# Patient Record
Sex: FEMALE | Race: BLACK OR AFRICAN AMERICAN | Employment: UNEMPLOYED | URBAN - METROPOLITAN AREA
[De-identification: names, ages, dates, MRNs, and addresses within clinical notes are randomized per-mention and may not be internally consistent; named-entity substitution may affect disease eponyms.]

---

## 2019-10-30 ENCOUNTER — HOSPITAL ENCOUNTER (EMERGENCY)
Age: 64
Discharge: HOME | End: 2019-10-31
Attending: EMERGENCY MEDICINE
Payer: MEDICARE

## 2019-10-30 ENCOUNTER — APPOINTMENT (OUTPATIENT)
Dept: GENERAL RADIOLOGY | Age: 64
End: 2019-10-30
Attending: NURSE PRACTITIONER
Payer: MEDICARE

## 2019-10-30 VITALS
DIASTOLIC BLOOD PRESSURE: 85 MMHG | OXYGEN SATURATION: 97 % | RESPIRATION RATE: 18 BRPM | SYSTOLIC BLOOD PRESSURE: 125 MMHG | TEMPERATURE: 98.8 F | HEART RATE: 89 BPM

## 2019-10-30 DIAGNOSIS — M79.18 MUSCULOSKELETAL PAIN: ICD-10-CM
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
## 2019-10-30 PROCEDURE — 72100 X-RAY EXAM L-S SPINE 2/3 VWS: CPT

## 2019-10-30 PROCEDURE — 73501 X-RAY EXAM HIP UNI 1 VIEW: CPT

## 2019-10-30 PROCEDURE — 99281 EMR DPT VST MAYX REQ PHY/QHP: CPT

## 2019-10-31 RX ORDER — METHOCARBAMOL 500 MG/1
500 TABLET, FILM COATED ORAL 4 TIMES DAILY
Qty: 20 TAB | Refills: 0 | Status: SHIPPED | OUTPATIENT
Start: 2019-10-31

## 2019-10-31 RX ORDER — DEXTROMETHORPHAN HYDROBROMIDE, GUAIFENESIN 5; 100 MG/5ML; MG/5ML
650 LIQUID ORAL EVERY 8 HOURS
Qty: 20 TAB | Refills: 0 | Status: SHIPPED | OUTPATIENT
Start: 2019-10-31

## 2019-10-31 NOTE — DISCHARGE INSTRUCTIONS
Patient Education        Preventing Falls: Care Instructions  Your Care Instructions    Getting around your home safely can be a challenge if you have injuries or health problems that make it easy for you to fall. Loose rugs and furniture in walkways are among the dangers for many older people who have problems walking or who have poor eyesight. People who have conditions such as arthritis, osteoporosis, or dementia also have to be careful not to fall. You can make your home safer with a few simple measures. Follow-up care is a key part of your treatment and safety. Be sure to make and go to all appointments, and call your doctor if you are having problems. It's also a good idea to know your test results and keep a list of the medicines you take. How can you care for yourself at home? Taking care of yourself  · You may get dizzy if you do not drink enough water. To prevent dehydration, drink plenty of fluids, enough so that your urine is light yellow or clear like water. Choose water and other caffeine-free clear liquids. If you have kidney, heart, or liver disease and have to limit fluids, talk with your doctor before you increase the amount of fluids you drink. · Exercise regularly to improve your strength, muscle tone, and balance. Walk if you can. Swimming may be a good choice if you cannot walk easily. · Have your vision and hearing checked each year or any time you notice a change. If you have trouble seeing and hearing, you might not be able to avoid objects and could lose your balance. · Know the side effects of the medicines you take. Ask your doctor or pharmacist whether the medicines you take can affect your balance. Sleeping pills or sedatives can affect your balance. · Limit the amount of alcohol you drink. Alcohol can impair your balance and other senses. · Ask your doctor whether calluses or corns on your feet need to be removed.  If you wear loose-fitting shoes because of calluses or corns, you can lose your balance and fall. · Talk to your doctor if you have numbness in your feet. Preventing falls at home  · Remove raised doorway thresholds, throw rugs, and clutter. Repair loose carpet or raised areas in the floor. · Move furniture and electrical cords to keep them out of walking paths. · Use nonskid floor wax, and wipe up spills right away, especially on ceramic tile floors. · If you use a walker or cane, put rubber tips on it. If you use crutches, clean the bottoms of them regularly with an abrasive pad, such as steel wool. · Keep your house well lit, especially Corita Bal, and outside walkways. Use night-lights in areas such as hallways and bathrooms. Add extra light switches or use remote switches (such as switches that go on or off when you clap your hands) to make it easier to turn lights on if you have to get up during the night. · Install sturdy handrails on stairways. · Move items in your cabinets so that the things you use a lot are on the lower shelves (about waist level). · Keep a cordless phone and a flashlight with new batteries by your bed. If possible, put a phone in each of the main rooms of your house, or carry a cell phone in case you fall and cannot reach a phone. Or, you can wear a device around your neck or wrist. You push a button that sends a signal for help. · Wear low-heeled shoes that fit well and give your feet good support. Use footwear with nonskid soles. Check the heels and soles of your shoes for wear. Repair or replace worn heels or soles. · Do not wear socks without shoes on wood floors. · Walk on the grass when the sidewalks are slippery. If you live in an area that gets snow and ice in the winter, sprinkle salt on slippery steps and sidewalks. Preventing falls in the bath  · Install grab bars and nonskid mats inside and outside your shower or tub and near the toilet and sinks. · Use shower chairs and bath benches.   · Use a hand-held shower head that will allow you to sit while showering. · Get into a tub or shower by putting the weaker leg in first. Get out of a tub or shower with your strong side first.  · Repair loose toilet seats and consider installing a raised toilet seat to make getting on and off the toilet easier. · Keep your bathroom door unlocked while you are in the shower. Where can you learn more? Go to http://bambi-trish.info/. Enter [phone] in the search box to learn more about \"Preventing Falls: Care Instructions. \"  Current as of: March 16, 2018  Content Version: 11.8  © 7242-2593 [employer]wise, [employer]. Care instructions adapted under license by [employer] (which disclaims liability or warranty for this information). If you have questions about a medical condition or this instruction, always ask your healthcare professional. [address] any warranty or liability for your use of this information.

## 2019-10-31 NOTE — ED TRIAGE NOTES
Patient A/O x 4, complaining of lower back pain, left sided hip pain x yesterday. Patient states she missed the chair and fell on floor.

## 2019-10-31 NOTE — ED PROVIDER NOTES
DR. [facility]  Emergency Department Treatment Report        10:43 PM Tiffany Mares is a 66 y.o. female with a history of a fall yesterday when she was trying to sit on a chair and it fell out from underneath her. She landed on her bottom who presents to ED with low back pain and left hip pain. Patient is ambulatory states she fell yesterday and decided to come in tonight and get checked out. No numbness or tingling is reported no saddle anesthesia no urinary incontinence or retention. No other complaints, associated symptoms or modifying factors at this time. PCP: None      The history is provided by the patient. No  was used. Fall   The accident occurred 12 to 24 hours ago. The fall occurred while standing. She fell from a height of ground level. She landed on hard floor. The point of impact was the left hip. The pain is present in the left hip. The pain is mild. She was ambulatory at the scene. There was no entrapment after the fall. There was no drug use involved in the accident. There was no alcohol use involved in the accident. Pertinent negatives include no fever, no abdominal pain, no nausea, no vomiting, no hematuria, no extremity weakness, no hearing loss and no loss of consciousness. The symptoms are aggravated by activity. She has tried nothing for the symptoms.         Past Medical History:   Diagnosis Date    Atrial fibrillation ([address]     Atrial fibrillation (HCC)     Migraine headache     Other and unspecified hyperlipidemia     Paroxysmal supraventricular tachycardia (HCC)     Poisoning by cardiac rhythm regulators(972.0)     Shortness of breath     Undiagnosed cardiac murmurs     Unspecified combined systolic and diastolic heart failure (HCC)        Past Surgical History:   Procedure Laterality Date    HX HERNIA REPAIR      HX HYSTERECTOMY           Family History:   Problem Relation Age of Onset    Stroke Father        Social History Socioeconomic History    Marital status: SINGLE     Spouse name: Not on file    Number of children: Not on file    Years of education: Not on file    Highest education level: Not on file   Occupational History    Not on file   Social Needs    Financial resource strain: Not on file    Food insecurity:     Worry: Not on file     Inability: Not on file    Transportation needs:     Medical: Not on file     Non-medical: Not on file   Tobacco Use    Smoking status: Current Every Day Smoker   Substance and Sexual Activity    Alcohol use: No    Drug use: No    Sexual activity: Not on file   Lifestyle    Physical activity:     Days per week: Not on file     Minutes per session: Not on file    Stress: Not on file   Relationships    Social connections:     Talks on phone: Not on file     Gets together: Not on file     Attends Hindu service: Not on file     Active member of club or organization: Not on file     Attends meetings of clubs or organizations: Not on file     Relationship status: Not on file    Intimate partner violence:     Fear of current or ex partner: Not on file     Emotionally abused: Not on file     Physically abused: Not on file     Forced sexual activity: Not on file   Other Topics Concern    Not on file   Social History Narrative    Not on file         ALLERGIES: Latex; Motrin [ibuprofen]; Shellfish derived; and Sulfa (sulfonamide antibiotics)    Review of Systems   Constitutional: Negative for fever. Respiratory: Negative for chest tightness. Cardiovascular: Negative for chest pain. Gastrointestinal: Negative for abdominal pain, nausea and vomiting. Genitourinary: Negative for hematuria. Musculoskeletal: Positive for arthralgias and back pain. Negative for extremity weakness, gait problem, joint swelling, neck pain and neck stiffness. Skin: Negative for color change. Neurological: Negative for dizziness and loss of consciousness.    All other systems reviewed and are negative. Vitals:    10/30/19 2238   BP: 125/85   Pulse: 89   Resp: 18   Temp: 98.8 °F (37.1 °C)   SpO2: 97%            Physical Exam   Constitutional: She is oriented to person, place, and time. She appears well-developed and well-nourished. HENT:   Head: Normocephalic and atraumatic. Eyes: Pupils are equal, round, and reactive to light. Conjunctivae and EOM are normal.   Neck: Normal range of motion. Neck supple. Cardiovascular: Normal rate and regular rhythm. Pulmonary/Chest: Effort normal and breath sounds normal.   Abdominal: Soft. Bowel sounds are normal.   Musculoskeletal: Normal range of motion. She exhibits tenderness. She exhibits no edema or deformity. Back:         Legs:  Neurological: She is alert and oriented to person, place, and time. She has normal strength and normal reflexes. No sensory deficit. She displays a negative Romberg sign. GCS eye subscore is 4. GCS verbal subscore is 5. GCS motor subscore is 6. Skin: Skin is warm and dry. Psychiatric: She has a normal mood and affect. Her behavior is normal. Judgment and thought content normal.   Nursing note and vitals reviewed. MDM  Number of Diagnoses or Management Options  Fall, initial encounter:   Musculoskeletal pain:   Diagnosis management comments: I suspect soft tissue injury after fall I will order hip and low back x-ray suspect I will be able to discharge patient home with Robaxin and Motrin. X-rays are negative for any acute findings I suspect musculoskeletal pain related to the fall. Patient will be treated with Robaxin for outpatient I offered Motrin patient did not want Motrin wanted Tylenol instead I provided a prescription for Tylenol. Patient will follow-up with her primary care and return to the emergency room if worse.        Amount and/or Complexity of Data Reviewed  Tests in the radiology section of CPT®: ordered and reviewed  Review and summarize past medical records: yes  Independent visualization of images, tracings, or specimens: yes    Risk of Complications, Morbidity, and/or Mortality  Presenting problems: low  Diagnostic procedures: low  Management options: low    Patient Progress  Patient progress: stable         Procedures            Vitals:  Patient Vitals for the past 12 hrs:   Temp Pulse Resp BP SpO2   10/30/19 2238 98.8 °F (37.1 °C) 89 18 125/85 97 %           X-Ray, CT or other radiology findings or impressions:  XR HIP LT W OR WO PELV  1 VW    (Results Pending)   XR SPINE LUMB 2 OR 3 V    (Results Pending)           Disposition:    Diagnosis:   1. Fall, initial encounter    2. Musculoskeletal pain        Disposition: to Home      Follow-up Information     Follow up With Specialties Details Why Contact Info    Isis England MD [facility] In 2 days  [address] [facility]  [phone]             Patient's Medications   Start Taking    ACETAMINOPHEN (TYLENOL) 650 MG TBER    Take 1 Tab by mouth every eight (8) hours. METHOCARBAMOL (ROBAXIN) 500 MG TABLET    Take 1 Tab by mouth four (4) times daily. Continue Taking    ASPIRIN (ASPIRIN) 325 MG TABLET    Take 325 mg by mouth daily. CETIRIZINE (ZYRTEC) 10 MG TABLET    Take  by mouth. As directed. FAMOTIDINE (PEPCID) 20 MG TABLET    Take 20 mg by mouth daily. GUAIFENESIN SR (MUCINEX) 600 MG SR TABLET    Take 600 mg by mouth. As directed. HYDROCODONE-ACETAMINOPHEN 5-500 MG CAP    Take  by mouth. MEDROXYPROGESTERONE (PROVERA) 2.5 MG TABLET    Take  by mouth daily. METHOCARBAMOL (ROBAXIN) 750 MG TABLET    Take  by mouth four (4) times daily. MOMETASONE (NASONEX) 50 MCG/ACTUATION NASAL SPRAY    2 Sprays. As directed. MULTIVITS-MIN/FA/LYCOPENE/LUT (CENTRAL-WHITNEY SENIOR PO)    Take  by mouth. NAPROXEN SODIUM (ALEVE) 220 MG TABLET    Take 220 mg by mouth as needed. PROPAFENONE (RYTHMOL) 225 MG TABLET    Take 225 mg by mouth two (2) times a day.     TETRAHYDROZOLINE (VISINE) 0.05 % OPHTHALMIC SOLUTION    Administer 1 Drop to both eyes daily as needed. These Medications have changed    No medications on file   Stop Taking    No medications on file       Return to the ER if you are unable to obtain referral as directed. Douglas Porter's  results have been reviewed with her. She has been counseled regarding her diagnosis, treatment, and plan. She verbally conveys understanding and agreement of the signs, symptoms, diagnosis, treatment and prognosis and additionally agrees to follow up as discussed. She also agrees with the care-plan and conveys that all of her questions have been answered. I have also provided discharge instructions for her that include: educational information regarding their diagnosis and treatment, and list of reasons why they would want to return to the ED prior to their follow-up appointment, should her condition change. Mona Magaña ENP-C,FNP-C      Dragon voice recognition was used to generate this report, which may have resulted in some phonetic based errors in grammar and contents.  Even though attempts were made to correct all the mistakes, some may have been missed, and remained in the body of the document

## 2022-02-11 ENCOUNTER — TRANSCRIBE ORDER (OUTPATIENT)
Dept: SCHEDULING | Age: 67
End: 2022-02-11

## 2022-02-11 DIAGNOSIS — M75.100 ROTATOR CUFF SYNDROME: Primary | ICD-10-CM

## 2022-02-22 ENCOUNTER — HOSPITAL ENCOUNTER (OUTPATIENT)
Dept: MRI IMAGING | Age: 67
Discharge: HOME | End: 2022-02-22
Attending: ORTHOPAEDIC SURGERY
Payer: MEDICARE

## 2022-02-22 DIAGNOSIS — M75.100 ROTATOR CUFF SYNDROME: ICD-10-CM

## 2022-02-22 PROCEDURE — 73221 MRI JOINT UPR EXTREM W/O DYE: CPT

## 2023-10-18 NOTE — PERIOP NOTE
Pt was diagnosed with Bronchitis on 10-17-23. Symptoms include cough and congestion. No fever reported. Per Dr. Joss Del Angel, pt needs to postpone upcoming surgery by 4 weeks from diagnosis. Request faxed to Dr. Maria Eugenia Berg office. [facility] malena Rivera.

## 2023-10-23 PROBLEM — M65.312 TRIGGER THUMB OF LEFT HAND: Status: ACTIVE | Noted: 2023-10-23

## 2023-10-23 PROBLEM — M65.332 TRIGGER FINGER, LEFT MIDDLE FINGER: Status: ACTIVE | Noted: 2023-10-23

## 2023-10-23 NOTE — H&P
Patient Name:   Tri Calderon     YOB: 1955    Chief Complaint:  Trigger finger followup. History of Chief Complaint:  Ana Jaramillo presents today having continued triggering of the left hand thumb and 3rd digit. She has previously gone through injections. She has discussed surgery. At this point, she would require a trigger thumb and trigger finger release of the left hand. She is also having continued problems with her back. She has degenerative disc disease and hip pathology that are nontraumatic degenerative issues that may require surgery over time. We discussed physical therapy for that. Past Medical/Surgical History:    Disease/Disorder Date Side Surgery Date Side Comment   Acid reflux         AF - Atrial fibrillation         Arthritis         Depression         Headache         Heart attack 2015        Hiatal hernia         Hypercholesterolemia         Hypertension         IBS - Irritable bowel syndrome         Neuropathy         Seasonal allergy      DL 11/14/2022 -allergy shots   Stroke 09/2022        Urinary incontinence            Foot surgery  bilateral       Hysterectomy         Lipoma excision, thigh area 11/2022 left       Rotator cuff repair 08/2022 right       Tonsillectomy        Allergies:    Ingredient Reaction Medication Name Comment   [address]   can take coated aspirin   SULFA (SULFONAMIDE ANTIBIOTICS)      IBUPROFEN      LATEX        Current Medications:    Medication Directions   [place] 0.025 % (0.035 %) eye drops INSTILL 1 DROP INTO BOTH EYES TWICE A DAY AS NEEDED   albuterol sulfate HFA 90 mcg/actuation aerosol inhaler INHALE 2 PUFF USING INHALER EVERY FOUR TO SIX HOURS AS NEEDED FOR COUGH, WHEEZE, SHORTNESS OF BREATH   amlodipine 5 mg tablet TAKE 1 TABLET (5 MG TOTAL) BY MOUTH DAILY.    azelastine 0.05 % eye drops INSTILL 1 DROP INTO BOTH EYES TWICE A DAY AS NEEDED   butalbital-acetaminophen-caffeine 50 mg-325 mg-40 mg tablet

## 2023-10-30 ENCOUNTER — ANESTHESIA EVENT (OUTPATIENT)
Facility: HOSPITAL | Age: 68
End: 2023-10-30
Payer: MEDICARE

## 2023-10-30 ENCOUNTER — HOSPITAL ENCOUNTER (OUTPATIENT)
Facility: HOSPITAL | Age: 68
Setting detail: OUTPATIENT SURGERY
Discharge: HOME | End: 2023-10-30
Attending: ORTHOPAEDIC SURGERY | Admitting: ORTHOPAEDIC SURGERY
Payer: MEDICARE

## 2023-10-30 ENCOUNTER — ANESTHESIA (OUTPATIENT)
Facility: HOSPITAL | Age: 68
End: 2023-10-30
Payer: MEDICARE

## 2023-10-30 VITALS
HEART RATE: 69 BPM | OXYGEN SATURATION: 99 % | HEIGHT: 63 IN | SYSTOLIC BLOOD PRESSURE: 154 MMHG | DIASTOLIC BLOOD PRESSURE: 80 MMHG | RESPIRATION RATE: 16 BRPM | WEIGHT: 171.3 LBS | TEMPERATURE: 97.3 F | BODY MASS INDEX: 30.35 KG/M2

## 2023-10-30 DIAGNOSIS — M65.332 TRIGGER FINGER, LEFT MIDDLE FINGER: Primary | ICD-10-CM

## 2023-10-30 PROCEDURE — 2580000003 HC RX 258: Performed by: ORTHOPAEDIC SURGERY

## 2023-10-30 PROCEDURE — 2500000003 HC RX 250 WO HCPCS: Performed by: NURSE ANESTHETIST, CERTIFIED REGISTERED

## 2023-10-30 PROCEDURE — 7100000010 HC PHASE II RECOVERY - FIRST 15 MIN: Performed by: ORTHOPAEDIC SURGERY

## 2023-10-30 PROCEDURE — 3600000002 HC SURGERY LEVEL 2 BASE: Performed by: ORTHOPAEDIC SURGERY

## 2023-10-30 PROCEDURE — 7100000011 HC PHASE II RECOVERY - ADDTL 15 MIN: Performed by: ORTHOPAEDIC SURGERY

## 2023-10-30 PROCEDURE — 3700000000 HC ANESTHESIA ATTENDED CARE: Performed by: ORTHOPAEDIC SURGERY

## 2023-10-30 PROCEDURE — 2709999900 HC NON-CHARGEABLE SUPPLY: Performed by: ORTHOPAEDIC SURGERY

## 2023-10-30 PROCEDURE — 6360000002 HC RX W HCPCS: Performed by: ORTHOPAEDIC SURGERY

## 2023-10-30 PROCEDURE — 3600000012 HC SURGERY LEVEL 2 ADDTL 15MIN: Performed by: ORTHOPAEDIC SURGERY

## 2023-10-30 PROCEDURE — 6360000002 HC RX W HCPCS: Performed by: NURSE ANESTHETIST, CERTIFIED REGISTERED

## 2023-10-30 PROCEDURE — 3700000001 HC ADD 15 MINUTES (ANESTHESIA): Performed by: ORTHOPAEDIC SURGERY

## 2023-10-30 RX ORDER — IPRATROPIUM BROMIDE AND ALBUTEROL SULFATE 2.5; .5 MG/3ML; MG/3ML
1 SOLUTION RESPIRATORY (INHALATION)
Status: DISCONTINUED | OUTPATIENT
Start: 2023-10-30 | End: 2023-10-30 | Stop reason: HOSPADM

## 2023-10-30 RX ORDER — SODIUM CHLORIDE, SODIUM LACTATE, POTASSIUM CHLORIDE, CALCIUM CHLORIDE 600; 310; 30; 20 MG/100ML; MG/100ML; MG/100ML; MG/100ML
INJECTION, SOLUTION INTRAVENOUS CONTINUOUS
Status: DISCONTINUED | OUTPATIENT
Start: 2023-10-30 | End: 2023-10-30 | Stop reason: HOSPADM

## 2023-10-30 RX ORDER — LIDOCAINE HYDROCHLORIDE 20 MG/ML
INJECTION, SOLUTION EPIDURAL; INFILTRATION; INTRACAUDAL; PERINEURAL PRN
Status: DISCONTINUED | OUTPATIENT
Start: 2023-10-30 | End: 2023-10-30 | Stop reason: SDUPTHER

## 2023-10-30 RX ORDER — ACETAMINOPHEN AND CODEINE PHOSPHATE 300; 30 MG/1; MG/1
1 TABLET ORAL EVERY 6 HOURS PRN
Qty: 20 TABLET | Refills: 0 | Status: SHIPPED | OUTPATIENT
Start: 2023-10-30 | End: 2023-11-04

## 2023-10-30 RX ORDER — SODIUM CHLORIDE 0.9 % (FLUSH) 0.9 %
5-40 SYRINGE (ML) INJECTION EVERY 12 HOURS SCHEDULED
Status: DISCONTINUED | OUTPATIENT
Start: 2023-10-30 | End: 2023-10-30 | Stop reason: HOSPADM

## 2023-10-30 RX ORDER — FENTANYL CITRATE 50 UG/ML
25 INJECTION, SOLUTION INTRAMUSCULAR; INTRAVENOUS EVERY 5 MIN PRN
Status: DISCONTINUED | OUTPATIENT
Start: 2023-10-30 | End: 2023-10-30 | Stop reason: HOSPADM

## 2023-10-30 RX ORDER — FENTANYL CITRATE 50 UG/ML
INJECTION, SOLUTION INTRAMUSCULAR; INTRAVENOUS PRN
Status: DISCONTINUED | OUTPATIENT
Start: 2023-10-30 | End: 2023-10-30 | Stop reason: SDUPTHER

## 2023-10-30 RX ORDER — MIDAZOLAM HYDROCHLORIDE 1 MG/ML
INJECTION INTRAMUSCULAR; INTRAVENOUS PRN
Status: DISCONTINUED | OUTPATIENT
Start: 2023-10-30 | End: 2023-10-30 | Stop reason: SDUPTHER

## 2023-10-30 RX ORDER — PROPOFOL 10 MG/ML
INJECTION, EMULSION INTRAVENOUS CONTINUOUS PRN
Status: DISCONTINUED | OUTPATIENT
Start: 2023-10-30 | End: 2023-10-30 | Stop reason: SDUPTHER

## 2023-10-30 RX ORDER — METOCLOPRAMIDE HYDROCHLORIDE 5 MG/ML
10 INJECTION INTRAMUSCULAR; INTRAVENOUS
Status: DISCONTINUED | OUTPATIENT
Start: 2023-10-30 | End: 2023-10-30 | Stop reason: HOSPADM

## 2023-10-30 RX ORDER — ONDANSETRON 2 MG/ML
INJECTION INTRAMUSCULAR; INTRAVENOUS PRN
Status: DISCONTINUED | OUTPATIENT
Start: 2023-10-30 | End: 2023-10-30 | Stop reason: SDUPTHER

## 2023-10-30 RX ORDER — HYDROMORPHONE HYDROCHLORIDE 1 MG/ML
0.5 INJECTION, SOLUTION INTRAMUSCULAR; INTRAVENOUS; SUBCUTANEOUS EVERY 5 MIN PRN
Status: DISCONTINUED | OUTPATIENT
Start: 2023-10-30 | End: 2023-10-30 | Stop reason: HOSPADM

## 2023-10-30 RX ORDER — CEPHALEXIN 500 MG/1
500 CAPSULE ORAL 4 TIMES DAILY
Qty: 28 CAPSULE | Refills: 0 | Status: SHIPPED | OUTPATIENT
Start: 2023-10-30 | End: 2023-11-06

## 2023-10-30 RX ORDER — SODIUM CHLORIDE 0.9 % (FLUSH) 0.9 %
5-40 SYRINGE (ML) INJECTION PRN
Status: DISCONTINUED | OUTPATIENT
Start: 2023-10-30 | End: 2023-10-30 | Stop reason: HOSPADM

## 2023-10-30 RX ORDER — SODIUM CHLORIDE 9 MG/ML
INJECTION, SOLUTION INTRAVENOUS PRN
Status: DISCONTINUED | OUTPATIENT
Start: 2023-10-30 | End: 2023-10-30 | Stop reason: HOSPADM

## 2023-10-30 RX ORDER — BUPIVACAINE HYDROCHLORIDE 2.5 MG/ML
INJECTION, SOLUTION EPIDURAL; INFILTRATION; INTRACAUDAL PRN
Status: DISCONTINUED | OUTPATIENT
Start: 2023-10-30 | End: 2023-10-30 | Stop reason: ALTCHOICE

## 2023-10-30 RX ADMIN — LIDOCAINE HYDROCHLORIDE 80 MG: 20 INJECTION, SOLUTION EPIDURAL; INFILTRATION; INTRACAUDAL; PERINEURAL at 07:37

## 2023-10-30 RX ADMIN — PROPOFOL 75 MCG/KG/MIN: 10 INJECTION, EMULSION INTRAVENOUS at 07:38

## 2023-10-30 RX ADMIN — ONDANSETRON HYDROCHLORIDE 4 MG: 2 INJECTION INTRAMUSCULAR; INTRAVENOUS at 08:03

## 2023-10-30 RX ADMIN — PROPOFOL 25 MG: 10 INJECTION, EMULSION INTRAVENOUS at 07:39

## 2023-10-30 RX ADMIN — MIDAZOLAM 2 MG: 1 INJECTION INTRAMUSCULAR; INTRAVENOUS at 07:30

## 2023-10-30 RX ADMIN — WATER 2000 MG: 1 INJECTION INTRAMUSCULAR; INTRAVENOUS; SUBCUTANEOUS at 07:37

## 2023-10-30 RX ADMIN — FENTANYL CITRATE 25 MCG: 50 INJECTION, SOLUTION INTRAMUSCULAR; INTRAVENOUS at 07:40

## 2023-10-30 RX ADMIN — SODIUM CHLORIDE, SODIUM LACTATE, POTASSIUM CHLORIDE, AND CALCIUM CHLORIDE: 600; 310; 30; 20 INJECTION, SOLUTION INTRAVENOUS at 07:30

## 2023-10-30 ASSESSMENT — ENCOUNTER SYMPTOMS: SHORTNESS OF BREATH: 1

## 2023-10-30 ASSESSMENT — PAIN SCALES - GENERAL
PAINLEVEL_OUTOF10: 0

## 2023-10-30 ASSESSMENT — PAIN - FUNCTIONAL ASSESSMENT
PAIN_FUNCTIONAL_ASSESSMENT: 0-10
PAIN_FUNCTIONAL_ASSESSMENT: ACTIVITIES ARE NOT PREVENTED
PAIN_FUNCTIONAL_ASSESSMENT: ACTIVITIES ARE NOT PREVENTED

## 2023-10-30 ASSESSMENT — PAIN DESCRIPTION - DESCRIPTORS: DESCRIPTORS: ACHING;DULL

## 2023-10-30 ASSESSMENT — COPD QUESTIONNAIRES: CAT_SEVERITY: MODERATE

## 2023-10-30 NOTE — PERIOP NOTE
TRANSFER - IN REPORT:    Verbal report received from ALO Ames RN on Ruba Darden  being received from PACU for routine post-op      Report consisted of patient's Situation, Background, Assessment and   Recommendations(SBAR). Information from the following report(s) Nurse Handoff Report, Adult Overview, Surgery Report, Intake/Output, and MAR was reviewed with the receiving nurse. Opportunity for questions and clarification was provided. Assessment completed upon patient's arrival to unit and care assumed.

## 2023-10-30 NOTE — INTERVAL H&P NOTE
Update History & Physical    The patient's History and Physical of October 30, chart was reviewed with the patient and I examined the patient. There was no change. The surgical site was confirmed by the patient and me. Plan: The risks, benefits, expected outcome, and alternative to the recommended procedure have been discussed with the patient. Patient understands and wants to proceed with the procedure.      Electronically signed by Lc De La Rosa MD on 10/30/2023 at 7:12 AM

## 2023-10-30 NOTE — OP NOTE
had been anesthetized with 0.25% Marcaine without epinephrine, 5ml. A soft sterile dressing was placed including and ace wrap. The patient was recovered from anesthesia and was transferred to the post anesthesia care unit in stable condition.

## 2023-10-30 NOTE — PERIOP NOTE
TRANSFER - IN REPORT:    Verbal report received from Cricket Martinez, RN ,  CRNA on Janes Crabtree  being received from OR  for routine progression of patient care      Report consisted of patient's Situation, Background, Assessment and   Recommendations(SBAR). Information from the following report(s) Surgery Report, Intake/Output, and MAR was reviewed with the receiving nurse. Opportunity for questions and clarification was provided. Assessment completed upon patient's arrival to unit and care assumed.

## 2023-10-30 NOTE — PERIOP NOTE
TRANSFER - OUT REPORT:    Verbal report given to [address]  on Jose Angel Saxena  being transferred to Phase II  for routine progression of patient care       Report consisted of patient's Situation, Background, Assessment and   Recommendations(SBAR). Information from the following report(s) Surgery Report, Intake/Output, MAR, and Recent Results was reviewed with the receiving nurse. Lines:   Peripheral IV 10/30/23 Posterior;Right Hand (Active)   Site Assessment Clean, dry & intact 10/30/23 0731   Line Status Infusing 10/30/23 0731   Phlebitis Assessment No symptoms 10/30/23 0731   Infiltration Assessment 0 10/30/23 0731   Alcohol Cap Used No 10/30/23 0731   Dressing Status New dressing applied;Clean, dry & intact 10/30/23 0731   Dressing Type Transparent 10/30/23 0731   Dressing Intervention New 10/30/23 0731        Opportunity for questions and clarification was provided.       Patient transported with:  Registered Nurse

## 2023-10-30 NOTE — PERIOP NOTE
Notified both Dr. Adonis Gallegos and Dr. Sanna Song of last dose of eliquis and ASA. Okay to proceed and no need for pt/inr or ptt day of surgery per both Dr. Adonis Gallegos and Dr. Sanna Song.

## 2023-10-30 NOTE — PERIOP NOTE
Pt. Used restroom in pre-op area with assistance. Patient placed on Maritza Paws for a minimum of 30 min in  Preop.

## 2023-10-30 NOTE — ANESTHESIA POSTPROCEDURE EVALUATION
Department of Anesthesiology  Postprocedure Note    Patient: Ronda Shepard  MRN: [id number]  YOB: 1955  Date of evaluation: 10/30/2023      Procedure Summary     Date: 10/30/23 Room / Location: [facility] 08 / [facility] OR    Anesthesia Start: 0735 Anesthesia Stop:     Procedure: LEFT TRIGGER THUMB RELEASE AND LEFT THIRD DIGIT TRIGGER RELEASE \"SPEC POP\" (Left: Fingers) Diagnosis:       Trigger thumb of left hand      Trigger middle finger of left hand      (Trigger thumb of left hand [M65.312])      (Trigger middle finger of left hand [M65.332])    Surgeons: Ted Alexander MD Responsible Provider: Julieta Evans MD    Anesthesia Type: MAC ASA Status: 3          Anesthesia Type: MAC    Messi Phase I:      Messi Phase II:        Anesthesia Post Evaluation    Patient location during evaluation: PACU  Patient participation: complete - patient participated  Level of consciousness: awake  Pain score: 0  Airway patency: patent  Nausea & Vomiting: no nausea and no vomiting  Complications: no  Cardiovascular status: blood pressure returned to baseline and hemodynamically stable  Respiratory status: acceptable, nasal cannula and spontaneous ventilation  Hydration status: euvolemic  Pain management: adequate

## (undated) DEVICE — BANDAGE,ELASTIC,ESMARK,STERILE,4"X9',LF: Brand: MEDLINE

## (undated) DEVICE — DRESSING STERILE PETRO W3XL8IN N ADH OIL EMUL GZ CURAD

## (undated) DEVICE — 3M™ TEGADERM™ TRANSPARENT FILM DRESSING FRAME STYLE, 1626W, 4 IN X 4-3/4 IN (10 CM X 12 CM), 50/CT 4CT/CASE: Brand: 3M™ TEGADERM™

## (undated) DEVICE — SUTURE ETHLN SZ 4-0 L18IN NONABSORBABLE BLK L19MM PS-2 3/8 1667H

## (undated) DEVICE — GLOVE SURG SZ 65 L12IN FNGR THK79MIL GRN LTX FREE

## (undated) DEVICE — GLOVE SURG SZ 65 CRM LTX FREE POLYISOPRENE POLYMER BEAD ANTI

## (undated) DEVICE — GLOVE SURG SZ 7 L12IN FNGR THK79MIL GRN LTX FREE

## (undated) DEVICE — BNDG,ELSTC,MATRIX,STRL,3"X5YD,LF,HOOK&LP: Brand: MEDLINE

## (undated) DEVICE — SOLUTION IRRIG 500ML 0.9% SOD CHLO USP POUR PLAS BTL

## (undated) DEVICE — GARMENT,MEDLINE,DVT,INT,CALF,MED, GEN2: Brand: MEDLINE

## (undated) DEVICE — PACK PROCEDURE SURG EXTREMITY CUST

## (undated) DEVICE — GOWN,SIRUS,NONRNF,SETINSLV,2XL,18/CS: Brand: MEDLINE

## (undated) DEVICE — GLOVE SURG SZ 9 THK91MIL LTX FREE SYN POLYISOPRENE ANTI

## (undated) DEVICE — APPLICATOR MEDICATED 26 CC SOLUTION HI LT ORNG CHLORAPREP

## (undated) DEVICE — PADDING,UNDERCAST,COTTON, 3X4YD STERILE: Brand: MEDLINE